# Patient Record
Sex: FEMALE | Race: WHITE
[De-identification: names, ages, dates, MRNs, and addresses within clinical notes are randomized per-mention and may not be internally consistent; named-entity substitution may affect disease eponyms.]

---

## 2017-05-10 ENCOUNTER — APPOINTMENT (OUTPATIENT)
Dept: HEMATOLOGY ONCOLOGY | Facility: CLINIC | Age: 52
End: 2017-05-10

## 2017-06-09 ENCOUNTER — OUTPATIENT (OUTPATIENT)
Dept: OUTPATIENT SERVICES | Facility: HOSPITAL | Age: 52
LOS: 1 days | Discharge: HOME | End: 2017-06-09

## 2017-06-28 DIAGNOSIS — Z12.31 ENCOUNTER FOR SCREENING MAMMOGRAM FOR MALIGNANT NEOPLASM OF BREAST: ICD-10-CM

## 2018-04-03 ENCOUNTER — OUTPATIENT (OUTPATIENT)
Dept: OUTPATIENT SERVICES | Facility: HOSPITAL | Age: 53
LOS: 1 days | Discharge: HOME | End: 2018-04-03

## 2018-04-03 DIAGNOSIS — E04.1 NONTOXIC SINGLE THYROID NODULE: ICD-10-CM

## 2018-05-21 ENCOUNTER — EMERGENCY (EMERGENCY)
Facility: HOSPITAL | Age: 53
LOS: 0 days | Discharge: HOME | End: 2018-05-21
Attending: EMERGENCY MEDICINE | Admitting: EMERGENCY MEDICINE

## 2018-05-21 VITALS
SYSTOLIC BLOOD PRESSURE: 125 MMHG | HEART RATE: 68 BPM | RESPIRATION RATE: 20 BRPM | TEMPERATURE: 98 F | OXYGEN SATURATION: 98 % | DIASTOLIC BLOOD PRESSURE: 75 MMHG
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
## 2018-05-21 DIAGNOSIS — M54.9 DORSALGIA, UNSPECIFIED: ICD-10-CM

## 2018-05-21 DIAGNOSIS — R51 HEADACHE: ICD-10-CM

## 2018-05-21 DIAGNOSIS — Z79.899 OTHER LONG TERM (CURRENT) DRUG THERAPY: ICD-10-CM
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
## 2018-05-21 DIAGNOSIS — S13.4XXA SPRAIN OF LIGAMENTS OF CERVICAL SPINE, INITIAL ENCOUNTER: ICD-10-CM

## 2018-05-21 DIAGNOSIS — M25.519 PAIN IN UNSPECIFIED SHOULDER: ICD-10-CM
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
## 2018-05-21 RX ORDER — METHOCARBAMOL 500 MG/1
2 TABLET, FILM COATED ORAL
Qty: 20 | Refills: 0 | OUTPATIENT
Start: 2018-05-21 | End: 2018-05-25

## 2018-05-21 RX ORDER — IBUPROFEN 200 MG
600 TABLET ORAL ONCE
Qty: 0 | Refills: 0 | Status: COMPLETED | OUTPATIENT
Start: 2018-05-21 | End: 2018-05-21

## 2018-05-21 RX ADMIN — Medication 600 MILLIGRAM(S): at 21:50
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
## 2018-05-22 ENCOUNTER — APPOINTMENT (OUTPATIENT)
Dept: OBGYN | Facility: CLINIC | Age: 53
End: 2018-05-22
Payer: COMMERCIAL

## 2018-05-22 VITALS — BODY MASS INDEX: 28.93 KG/M2 | WEIGHT: 180 LBS | HEIGHT: 66 IN

## 2018-05-22 LAB
BILIRUB UR QL STRIP: NORMAL
GLUCOSE UR-MCNC: NORMAL
HCG UR QL: 0.2 EU/DL
HGB UR QL STRIP.AUTO: NORMAL
KETONES UR-MCNC: NORMAL
LEUKOCYTE ESTERASE UR QL STRIP: NORMAL
NITRITE UR QL STRIP: NORMAL
PH UR STRIP: 7
PROT UR STRIP-MCNC: NORMAL
SP GR UR STRIP: 1.02

## 2018-05-22 PROCEDURE — 81003 URINALYSIS AUTO W/O SCOPE: CPT | Mod: QW

## 2018-05-22 PROCEDURE — 99396 PREV VISIT EST AGE 40-64: CPT

## 2018-05-26 ENCOUNTER — RESULT CHARGE (OUTPATIENT)
Age: 53
End: 2018-05-26

## 2018-05-26 ENCOUNTER — APPOINTMENT (OUTPATIENT)
Dept: OBGYN | Facility: CLINIC | Age: 53
End: 2018-05-26
Payer: COMMERCIAL

## 2018-05-26 PROCEDURE — 76830 TRANSVAGINAL US NON-OB: CPT

## 2018-05-26 PROCEDURE — 77085 DXA BONE DENSITY AXL VRT FX: CPT

## 2018-05-26 PROCEDURE — 76856 US EXAM PELVIC COMPLETE: CPT

## 2018-05-31 LAB — HPV HIGH+LOW RISK DNA PNL CVX: NOT DETECTED

## 2018-06-10 ENCOUNTER — FORM ENCOUNTER (OUTPATIENT)
Age: 53
End: 2018-06-10

## 2018-06-11 ENCOUNTER — OUTPATIENT (OUTPATIENT)
Dept: OUTPATIENT SERVICES | Facility: HOSPITAL | Age: 53
LOS: 1 days | Discharge: HOME | End: 2018-06-11

## 2018-06-11 DIAGNOSIS — Z12.31 ENCOUNTER FOR SCREENING MAMMOGRAM FOR MALIGNANT NEOPLASM OF BREAST: ICD-10-CM

## 2019-05-21 ENCOUNTER — APPOINTMENT (OUTPATIENT)
Dept: OBGYN | Facility: CLINIC | Age: 54
End: 2019-05-21

## 2019-05-28 ENCOUNTER — OUTPATIENT (OUTPATIENT)
Dept: OUTPATIENT SERVICES | Facility: HOSPITAL | Age: 54
LOS: 1 days | Discharge: HOME | End: 2019-05-28

## 2019-05-28 ENCOUNTER — APPOINTMENT (OUTPATIENT)
Dept: OBGYN | Facility: CLINIC | Age: 54
End: 2019-05-28
Payer: COMMERCIAL

## 2019-05-28 VITALS — BODY MASS INDEX: 29.73 KG/M2 | HEIGHT: 66 IN | WEIGHT: 185 LBS

## 2019-05-28 LAB
BILIRUB UR QL STRIP: NORMAL
GLUCOSE UR-MCNC: NORMAL
HCG UR QL: NORMAL EU/DL
HGB UR QL STRIP.AUTO: 250
KETONES UR-MCNC: NORMAL
LEUKOCYTE ESTERASE UR QL STRIP: NORMAL
NITRITE UR QL STRIP: NORMAL
PH UR STRIP: 5
PROT UR STRIP-MCNC: NORMAL
SP GR UR STRIP: 1.02

## 2019-05-28 PROCEDURE — 99396 PREV VISIT EST AGE 40-64: CPT

## 2019-05-28 PROCEDURE — 81002 URINALYSIS NONAUTO W/O SCOPE: CPT

## 2019-05-29 DIAGNOSIS — Z01.419 ENCOUNTER FOR GYNECOLOGICAL EXAMINATION (GENERAL) (ROUTINE) WITHOUT ABNORMAL FINDINGS: ICD-10-CM

## 2019-06-01 LAB — HPV HIGH+LOW RISK DNA PNL CVX: NOT DETECTED

## 2019-06-13 ENCOUNTER — FORM ENCOUNTER (OUTPATIENT)
Age: 54
End: 2019-06-13

## 2019-06-14 ENCOUNTER — OUTPATIENT (OUTPATIENT)
Dept: OUTPATIENT SERVICES | Facility: HOSPITAL | Age: 54
LOS: 1 days | Discharge: HOME | End: 2019-06-14
Payer: COMMERCIAL

## 2019-06-14 DIAGNOSIS — Z12.31 ENCOUNTER FOR SCREENING MAMMOGRAM FOR MALIGNANT NEOPLASM OF BREAST: ICD-10-CM

## 2019-06-14 PROCEDURE — 77067 SCR MAMMO BI INCL CAD: CPT | Mod: 26

## 2019-06-14 PROCEDURE — 77063 BREAST TOMOSYNTHESIS BI: CPT | Mod: 26

## 2020-06-06 ENCOUNTER — APPOINTMENT (OUTPATIENT)
Dept: OBGYN | Facility: CLINIC | Age: 55
End: 2020-06-06

## 2020-06-15 ENCOUNTER — OUTPATIENT (OUTPATIENT)
Dept: OUTPATIENT SERVICES | Facility: HOSPITAL | Age: 55
LOS: 1 days | Discharge: HOME | End: 2020-06-15
Payer: COMMERCIAL

## 2020-06-15 ENCOUNTER — RESULT REVIEW (OUTPATIENT)
Age: 55
End: 2020-06-15

## 2020-06-15 DIAGNOSIS — Z12.31 ENCOUNTER FOR SCREENING MAMMOGRAM FOR MALIGNANT NEOPLASM OF BREAST: ICD-10-CM

## 2020-06-15 PROCEDURE — 77067 SCR MAMMO BI INCL CAD: CPT | Mod: 26

## 2020-06-15 PROCEDURE — 77063 BREAST TOMOSYNTHESIS BI: CPT | Mod: 26

## 2020-07-06 ENCOUNTER — APPOINTMENT (OUTPATIENT)
Dept: OBGYN | Facility: CLINIC | Age: 55
End: 2020-07-06

## 2020-07-28 ENCOUNTER — APPOINTMENT (OUTPATIENT)
Dept: OBGYN | Facility: CLINIC | Age: 55
End: 2020-07-28
Payer: COMMERCIAL

## 2020-07-28 VITALS — BODY MASS INDEX: 30.53 KG/M2 | HEIGHT: 66 IN | TEMPERATURE: 97.2 F | WEIGHT: 190 LBS

## 2020-07-28 LAB
BILIRUB UR QL STRIP: NORMAL
CLARITY UR: CLEAR
GLUCOSE UR-MCNC: NORMAL
HCG UR QL: NORMAL EU/DL
HGB UR QL STRIP.AUTO: NORMAL
KETONES UR-MCNC: NORMAL
LEUKOCYTE ESTERASE UR QL STRIP: NORMAL
NITRITE UR QL STRIP: NORMAL
PH UR STRIP: 6.5
PROT UR STRIP-MCNC: NORMAL
SP GR UR STRIP: 1.01

## 2020-07-28 PROCEDURE — 81003 URINALYSIS AUTO W/O SCOPE: CPT | Mod: QW

## 2020-07-28 PROCEDURE — 99396 PREV VISIT EST AGE 40-64: CPT

## 2020-08-01 LAB — HPV HIGH+LOW RISK DNA PNL CVX: NOT DETECTED

## 2020-08-24 ENCOUNTER — APPOINTMENT (OUTPATIENT)
Dept: OBGYN | Facility: CLINIC | Age: 55
End: 2020-08-24
Payer: COMMERCIAL

## 2020-08-24 PROCEDURE — 77085 DXA BONE DENSITY AXL VRT FX: CPT

## 2020-12-16 ENCOUNTER — TRANSCRIPTION ENCOUNTER (OUTPATIENT)
Age: 55
End: 2020-12-16

## 2021-06-29 ENCOUNTER — NON-APPOINTMENT (OUTPATIENT)
Age: 56
End: 2021-06-29

## 2021-07-06 ENCOUNTER — NON-APPOINTMENT (OUTPATIENT)
Age: 56
End: 2021-07-06

## 2021-08-17 ENCOUNTER — OUTPATIENT (OUTPATIENT)
Dept: OUTPATIENT SERVICES | Facility: HOSPITAL | Age: 56
LOS: 1 days | Discharge: HOME | End: 2021-08-17
Payer: COMMERCIAL

## 2021-08-17 ENCOUNTER — RESULT REVIEW (OUTPATIENT)
Age: 56
End: 2021-08-17

## 2021-08-17 ENCOUNTER — APPOINTMENT (OUTPATIENT)
Dept: OBGYN | Facility: CLINIC | Age: 56
End: 2021-08-17
Payer: COMMERCIAL

## 2021-08-17 VITALS — BODY MASS INDEX: 30.53 KG/M2 | TEMPERATURE: 98.2 F | HEIGHT: 66 IN | WEIGHT: 190 LBS

## 2021-08-17 DIAGNOSIS — Z12.31 ENCOUNTER FOR SCREENING MAMMOGRAM FOR MALIGNANT NEOPLASM OF BREAST: ICD-10-CM

## 2021-08-17 PROCEDURE — 77067 SCR MAMMO BI INCL CAD: CPT | Mod: 26

## 2021-08-17 PROCEDURE — 99396 PREV VISIT EST AGE 40-64: CPT

## 2021-08-17 PROCEDURE — 81003 URINALYSIS AUTO W/O SCOPE: CPT | Mod: QW

## 2021-08-17 PROCEDURE — 77063 BREAST TOMOSYNTHESIS BI: CPT | Mod: 26

## 2021-08-25 LAB
BILIRUB UR QL STRIP: NORMAL
GLUCOSE UR-MCNC: NORMAL
HCG UR QL: 0.2 EU/DL
HGB UR QL STRIP.AUTO: NORMAL
HPV HIGH+LOW RISK DNA PNL CVX: NOT DETECTED
KETONES UR-MCNC: NORMAL
LEUKOCYTE ESTERASE UR QL STRIP: NORMAL
NITRITE UR QL STRIP: NORMAL
PH UR STRIP: 5
PROT UR STRIP-MCNC: NORMAL
SP GR UR STRIP: 1.02

## 2021-08-27 ENCOUNTER — APPOINTMENT (OUTPATIENT)
Dept: ORTHOPEDIC SURGERY | Facility: CLINIC | Age: 56
End: 2021-08-27

## 2021-09-06 LAB — CYTOLOGY CVX/VAG DOC THIN PREP: NORMAL

## 2021-12-08 ENCOUNTER — TRANSCRIPTION ENCOUNTER (OUTPATIENT)
Age: 56
End: 2021-12-08

## 2022-06-28 ENCOUNTER — NON-APPOINTMENT (OUTPATIENT)
Age: 57
End: 2022-06-28

## 2022-08-30 ENCOUNTER — APPOINTMENT (OUTPATIENT)
Dept: OBGYN | Facility: CLINIC | Age: 57
End: 2022-08-30

## 2022-08-30 VITALS — HEIGHT: 67 IN | TEMPERATURE: 98.1 F | WEIGHT: 200 LBS | BODY MASS INDEX: 31.39 KG/M2

## 2022-08-30 LAB
BILIRUB UR QL STRIP: NORMAL
GLUCOSE UR-MCNC: NORMAL
HCG UR QL: 0.2 EU/DL
HGB UR QL STRIP.AUTO: NORMAL
KETONES UR-MCNC: NORMAL
LEUKOCYTE ESTERASE UR QL STRIP: NORMAL
NITRITE UR QL STRIP: NORMAL
PH UR STRIP: 6.5
PROT UR STRIP-MCNC: NORMAL
SP GR UR STRIP: 1.02

## 2022-08-30 PROCEDURE — 99396 PREV VISIT EST AGE 40-64: CPT

## 2022-08-30 PROCEDURE — 81003 URINALYSIS AUTO W/O SCOPE: CPT | Mod: QW

## 2022-08-31 ENCOUNTER — RESULT REVIEW (OUTPATIENT)
Age: 57
End: 2022-08-31

## 2022-08-31 ENCOUNTER — OUTPATIENT (OUTPATIENT)
Dept: OUTPATIENT SERVICES | Facility: HOSPITAL | Age: 57
LOS: 1 days | Discharge: HOME | End: 2022-08-31

## 2022-08-31 DIAGNOSIS — Z12.31 ENCOUNTER FOR SCREENING MAMMOGRAM FOR MALIGNANT NEOPLASM OF BREAST: ICD-10-CM

## 2022-08-31 PROCEDURE — 77063 BREAST TOMOSYNTHESIS BI: CPT | Mod: 26

## 2022-08-31 PROCEDURE — 77067 SCR MAMMO BI INCL CAD: CPT | Mod: 26

## 2022-09-08 LAB — HPV HIGH+LOW RISK DNA PNL CVX: NOT DETECTED

## 2022-09-11 LAB — CYTOLOGY CVX/VAG DOC THIN PREP: NORMAL

## 2022-12-14 NOTE — ED ADULT NURSE NOTE - PSH
No significant past surgical history Simponi Counseling:  I discussed with the patient the risks of golimumab including but not limited to myelosuppression, immunosuppression, autoimmune hepatitis, demyelinating diseases, lymphoma, and serious infections.  The patient understands that monitoring is required including a PPD at baseline and must alert us or the primary physician if symptoms of infection or other concerning signs are noted.

## 2023-06-30 ENCOUNTER — NON-APPOINTMENT (OUTPATIENT)
Age: 58
End: 2023-06-30

## 2023-07-11 ENCOUNTER — NON-APPOINTMENT (OUTPATIENT)
Age: 58
End: 2023-07-11

## 2023-08-15 ENCOUNTER — APPOINTMENT (OUTPATIENT)
Dept: PULMONOLOGY | Facility: CLINIC | Age: 58
End: 2023-08-15
Payer: COMMERCIAL

## 2023-08-15 VITALS
HEIGHT: 67 IN | OXYGEN SATURATION: 99 % | DIASTOLIC BLOOD PRESSURE: 70 MMHG | HEART RATE: 77 BPM | SYSTOLIC BLOOD PRESSURE: 120 MMHG | BODY MASS INDEX: 29.82 KG/M2 | WEIGHT: 190 LBS

## 2023-08-15 DIAGNOSIS — R91.8 OTHER NONSPECIFIC ABNORMAL FINDING OF LUNG FIELD: ICD-10-CM

## 2023-08-15 DIAGNOSIS — J44.9 CHRONIC OBSTRUCTIVE PULMONARY DISEASE, UNSPECIFIED: ICD-10-CM

## 2023-08-15 PROCEDURE — 94010 BREATHING CAPACITY TEST: CPT

## 2023-08-15 PROCEDURE — 99204 OFFICE O/P NEW MOD 45 MIN: CPT | Mod: 25

## 2023-08-15 RX ORDER — ALBUTEROL SULFATE 90 UG/1
108 (90 BASE) INHALANT RESPIRATORY (INHALATION)
Qty: 1 | Refills: 3 | Status: ACTIVE | COMMUNITY
Start: 2023-08-15 | End: 1900-01-01

## 2023-08-15 NOTE — HISTORY OF PRESENT ILLNESS
[TextBox_4] : 58 year old female, heavy smoker of 1.5 ppd; active smoker still, spirometry done with moderate COPD, occasional cough, no dyspnea, presenting for follow up on her LDCT and for COPD management. On exam she has mild wheezing; will prescribe albuterol as needed. She was given breo by her PCP but she rarely uses it. Will need full PFTs and repeat LDCT end of the month.

## 2023-08-15 NOTE — ASSESSMENT
[FreeTextEntry1] : Moderate COPD  Active smoker  Smoking cessation next visit - not ready at the moment Will need PFTs next visit  LDCT to be repeated end of Aug  For COPD - Albuterol as needed  F/U in 3 months

## 2023-08-15 NOTE — PHYSICAL EXAM
[No Acute Distress] : no acute distress [Normal Appearance] : normal appearance [Normal Oropharynx] : normal oropharynx [No Neck Mass] : no neck mass [Normal Rate/Rhythm] : normal rate/rhythm [Normal S1, S2] : normal s1, s2 [No Murmurs] : no murmurs [Clear to Auscultation Bilaterally] : clear to auscultation bilaterally [Wheeze] : wheeze [No Abnormalities] : no abnormalities [Benign] : benign [Normal Gait] : normal gait [No Clubbing] : no clubbing [No Cyanosis] : no cyanosis [No Edema] : no edema [FROM] : FROM [Normal Color/ Pigmentation] : normal color/ pigmentation [No Focal Deficits] : no focal deficits [Oriented x3] : oriented x3 [Normal Affect] : normal affect

## 2023-08-28 ENCOUNTER — APPOINTMENT (OUTPATIENT)
Dept: OBGYN | Facility: CLINIC | Age: 58
End: 2023-08-28
Payer: COMMERCIAL

## 2023-08-28 PROCEDURE — 77080 DXA BONE DENSITY AXIAL: CPT

## 2023-09-05 ENCOUNTER — APPOINTMENT (OUTPATIENT)
Dept: OBGYN | Facility: CLINIC | Age: 58
End: 2023-09-05
Payer: COMMERCIAL

## 2023-09-05 VITALS — HEIGHT: 67 IN | BODY MASS INDEX: 29.82 KG/M2 | WEIGHT: 190 LBS

## 2023-09-05 DIAGNOSIS — Z01.419 ENCOUNTER FOR GYNECOLOGICAL EXAMINATION (GENERAL) (ROUTINE) W/OUT ABNORMAL FINDINGS: ICD-10-CM

## 2023-09-05 DIAGNOSIS — M85.851 OTHER SPECIFIED DISORDERS OF BONE DENSITY AND STRUCTURE, RIGHT THIGH: ICD-10-CM

## 2023-09-05 PROCEDURE — 81003 URINALYSIS AUTO W/O SCOPE: CPT | Mod: QW

## 2023-09-05 PROCEDURE — 99396 PREV VISIT EST AGE 40-64: CPT

## 2023-09-05 NOTE — HISTORY OF PRESENT ILLNESS
[FreeTextEntry1] : This 58-year-old female is here for an annual GYN exam.  Her last Pap test was normal along with HPV testing August 30, 2022.  Mammography was normal August 31, 2022 revealing dense breasts.  It is recommended by radiology that she have routine 3D screening mammography annually.  Bone density testing on August 28, 2023 revealed mild osteopenia of the femoral neck.  She has no GYN complaints at the present time.

## 2023-09-05 NOTE — DISCUSSION/SUMMARY
[FreeTextEntry1] : This annual GYN exam revealed very mild atrophic changes of the vulva and vagina.  Bone density testing revealed mild to moderate osteopenia of the femoral neck.  The patient will schedule her annual mammogram to be done this week.  She will otherwise return to this office as needed or in 1 year for her next Pap test.

## 2023-09-05 NOTE — PHYSICAL EXAM
[FreeTextEntry7] : Upper and lower abdomen was soft nontender with no palpable masses. [Normal] : normal [FreeTextEntry1] : External genitalia revealed no lesions and no evidence of any infection.  There were no atrophic changes externally. [FreeTextEntry2] : The labia and clitoris appeared normal with no identifiable lesions. [FreeTextEntry6] : Uterus and adnexa were normal size and nontender.

## 2023-09-07 LAB
BILIRUB UR QL STRIP: NEGATIVE
CLARITY UR: CLEAR
GLUCOSE UR-MCNC: NEGATIVE
HCG UR QL: 0.2 EU/DL
HGB UR QL STRIP.AUTO: NORMAL
KETONES UR-MCNC: NORMAL
LEUKOCYTE ESTERASE UR QL STRIP: NEGATIVE
NITRITE UR QL STRIP: NEGATIVE
PH UR STRIP: 5.5
PROT UR STRIP-MCNC: NORMAL
SP GR UR STRIP: 1.02

## 2023-09-09 ENCOUNTER — RESULT REVIEW (OUTPATIENT)
Age: 58
End: 2023-09-09

## 2023-09-09 ENCOUNTER — OUTPATIENT (OUTPATIENT)
Dept: OUTPATIENT SERVICES | Facility: HOSPITAL | Age: 58
LOS: 1 days | End: 2023-09-09
Payer: COMMERCIAL

## 2023-09-09 DIAGNOSIS — Z12.31 ENCOUNTER FOR SCREENING MAMMOGRAM FOR MALIGNANT NEOPLASM OF BREAST: ICD-10-CM

## 2023-09-09 PROCEDURE — 77067 SCR MAMMO BI INCL CAD: CPT | Mod: 26

## 2023-09-09 PROCEDURE — 77063 BREAST TOMOSYNTHESIS BI: CPT

## 2023-09-09 PROCEDURE — 77067 SCR MAMMO BI INCL CAD: CPT

## 2023-09-09 PROCEDURE — 77063 BREAST TOMOSYNTHESIS BI: CPT | Mod: 26

## 2023-09-10 DIAGNOSIS — Z12.31 ENCOUNTER FOR SCREENING MAMMOGRAM FOR MALIGNANT NEOPLASM OF BREAST: ICD-10-CM

## 2023-09-11 LAB
CYTOLOGY CVX/VAG DOC THIN PREP: NORMAL
HPV HIGH+LOW RISK DNA PNL CVX: NOT DETECTED

## 2023-12-27 ENCOUNTER — APPOINTMENT (OUTPATIENT)
Dept: PULMONOLOGY | Facility: CLINIC | Age: 58
End: 2023-12-27
Payer: COMMERCIAL

## 2023-12-27 ENCOUNTER — APPOINTMENT (OUTPATIENT)
Dept: PULMONOLOGY | Facility: CLINIC | Age: 58
End: 2023-12-27

## 2023-12-27 VITALS
BODY MASS INDEX: 31.32 KG/M2 | DIASTOLIC BLOOD PRESSURE: 65 MMHG | WEIGHT: 200 LBS | OXYGEN SATURATION: 96 % | HEART RATE: 88 BPM | SYSTOLIC BLOOD PRESSURE: 140 MMHG

## 2023-12-27 PROCEDURE — 94010 BREATHING CAPACITY TEST: CPT

## 2023-12-27 PROCEDURE — 94729 DIFFUSING CAPACITY: CPT

## 2023-12-27 PROCEDURE — 94727 GAS DIL/WSHOT DETER LNG VOL: CPT

## 2023-12-28 RX ORDER — ALBUTEROL SULFATE 90 UG/1
108 (90 BASE) INHALANT RESPIRATORY (INHALATION)
Qty: 1 | Refills: 3 | Status: ACTIVE | COMMUNITY
Start: 2023-12-28 | End: 1900-01-01

## 2023-12-28 RX ORDER — PREDNISONE 20 MG/1
20 TABLET ORAL
Qty: 20 | Refills: 0 | Status: ACTIVE | COMMUNITY
Start: 2023-12-28 | End: 1900-01-01

## 2024-06-17 ENCOUNTER — OUTPATIENT (OUTPATIENT)
Dept: OUTPATIENT SERVICES | Facility: HOSPITAL | Age: 59
LOS: 1 days | End: 2024-06-17
Payer: COMMERCIAL

## 2024-06-17 DIAGNOSIS — Z00.8 ENCOUNTER FOR OTHER GENERAL EXAMINATION: ICD-10-CM

## 2024-06-17 DIAGNOSIS — I25.10 ATHEROSCLEROTIC HEART DISEASE OF NATIVE CORONARY ARTERY WITHOUT ANGINA PECTORIS: ICD-10-CM

## 2024-06-17 PROCEDURE — 75574 CT ANGIO HRT W/3D IMAGE: CPT | Mod: 26

## 2024-06-17 PROCEDURE — 75574 CT ANGIO HRT W/3D IMAGE: CPT

## 2024-06-18 DIAGNOSIS — I25.10 ATHEROSCLEROTIC HEART DISEASE OF NATIVE CORONARY ARTERY WITHOUT ANGINA PECTORIS: ICD-10-CM

## 2024-09-13 ENCOUNTER — RESULT REVIEW (OUTPATIENT)
Age: 59
End: 2024-09-13

## 2024-09-13 ENCOUNTER — OUTPATIENT (OUTPATIENT)
Dept: OUTPATIENT SERVICES | Facility: HOSPITAL | Age: 59
LOS: 1 days | End: 2024-09-13
Payer: COMMERCIAL

## 2024-09-13 DIAGNOSIS — Z12.31 ENCOUNTER FOR SCREENING MAMMOGRAM FOR MALIGNANT NEOPLASM OF BREAST: ICD-10-CM

## 2024-09-13 PROCEDURE — 77063 BREAST TOMOSYNTHESIS BI: CPT | Mod: 26

## 2024-09-13 PROCEDURE — 77067 SCR MAMMO BI INCL CAD: CPT | Mod: 26

## 2024-09-13 PROCEDURE — 77063 BREAST TOMOSYNTHESIS BI: CPT

## 2024-09-13 PROCEDURE — 77067 SCR MAMMO BI INCL CAD: CPT

## 2024-09-14 DIAGNOSIS — Z12.31 ENCOUNTER FOR SCREENING MAMMOGRAM FOR MALIGNANT NEOPLASM OF BREAST: ICD-10-CM

## 2024-09-17 ENCOUNTER — OUTPATIENT (OUTPATIENT)
Dept: OUTPATIENT SERVICES | Facility: HOSPITAL | Age: 59
LOS: 1 days | End: 2024-09-17
Payer: COMMERCIAL

## 2024-09-17 ENCOUNTER — RESULT REVIEW (OUTPATIENT)
Age: 59
End: 2024-09-17

## 2024-09-17 DIAGNOSIS — R92.8 OTHER ABNORMAL AND INCONCLUSIVE FINDINGS ON DIAGNOSTIC IMAGING OF BREAST: ICD-10-CM

## 2024-09-17 PROCEDURE — G0279: CPT | Mod: 26

## 2024-09-17 PROCEDURE — 77065 DX MAMMO INCL CAD UNI: CPT | Mod: 26,LT

## 2024-09-17 PROCEDURE — 77061 BREAST TOMOSYNTHESIS UNI: CPT | Mod: 26,LT

## 2024-09-17 PROCEDURE — 77065 DX MAMMO INCL CAD UNI: CPT | Mod: LT

## 2024-09-17 PROCEDURE — G0279: CPT

## 2024-09-18 DIAGNOSIS — R92.8 OTHER ABNORMAL AND INCONCLUSIVE FINDINGS ON DIAGNOSTIC IMAGING OF BREAST: ICD-10-CM

## 2024-09-26 ENCOUNTER — RESULT REVIEW (OUTPATIENT)
Age: 59
End: 2024-09-26

## 2024-09-26 ENCOUNTER — OUTPATIENT (OUTPATIENT)
Dept: OUTPATIENT SERVICES | Facility: HOSPITAL | Age: 59
LOS: 1 days | End: 2024-09-26
Payer: COMMERCIAL

## 2024-09-26 ENCOUNTER — APPOINTMENT (OUTPATIENT)
Age: 59
End: 2024-09-26
Payer: COMMERCIAL

## 2024-09-26 DIAGNOSIS — R92.8 OTHER ABNORMAL AND INCONCLUSIVE FINDINGS ON DIAGNOSTIC IMAGING OF BREAST: ICD-10-CM

## 2024-09-26 PROCEDURE — 88360 TUMOR IMMUNOHISTOCHEM/MANUAL: CPT | Mod: 26

## 2024-09-26 PROCEDURE — 19081 BX BREAST 1ST LESION STRTCTC: CPT | Mod: LT

## 2024-09-26 PROCEDURE — 76098 X-RAY EXAM SURGICAL SPECIMEN: CPT | Mod: 26

## 2024-09-26 PROCEDURE — 88360 TUMOR IMMUNOHISTOCHEM/MANUAL: CPT

## 2024-09-26 PROCEDURE — 88305 TISSUE EXAM BY PATHOLOGIST: CPT | Mod: 26

## 2024-09-26 PROCEDURE — 76098 X-RAY EXAM SURGICAL SPECIMEN: CPT

## 2024-09-26 PROCEDURE — 88305 TISSUE EXAM BY PATHOLOGIST: CPT

## 2024-09-26 PROCEDURE — A4648: CPT

## 2024-09-26 PROCEDURE — 77065 DX MAMMO INCL CAD UNI: CPT | Mod: LT

## 2024-09-26 PROCEDURE — 77065 DX MAMMO INCL CAD UNI: CPT | Mod: 26,LT

## 2024-09-27 ENCOUNTER — TRANSCRIPTION ENCOUNTER (OUTPATIENT)
Age: 59
End: 2024-09-27

## 2024-09-27 LAB — SURGICAL PATHOLOGY STUDY: SIGNIFICANT CHANGE UP

## 2024-09-30 DIAGNOSIS — R92.8 OTHER ABNORMAL AND INCONCLUSIVE FINDINGS ON DIAGNOSTIC IMAGING OF BREAST: ICD-10-CM

## 2024-09-30 DIAGNOSIS — D24.2 BENIGN NEOPLASM OF LEFT BREAST: ICD-10-CM

## 2024-09-30 DIAGNOSIS — D05.12 INTRADUCTAL CARCINOMA IN SITU OF LEFT BREAST: ICD-10-CM

## 2024-10-01 ENCOUNTER — APPOINTMENT (OUTPATIENT)
Dept: OBGYN | Facility: CLINIC | Age: 59
End: 2024-10-01
Payer: COMMERCIAL

## 2024-10-01 DIAGNOSIS — Z01.419 ENCOUNTER FOR GYNECOLOGICAL EXAMINATION (GENERAL) (ROUTINE) W/OUT ABNORMAL FINDINGS: ICD-10-CM

## 2024-10-01 DIAGNOSIS — Z85.3 PERSONAL HISTORY OF MALIGNANT NEOPLASM OF BREAST: ICD-10-CM

## 2024-10-01 PROCEDURE — 99396 PREV VISIT EST AGE 40-64: CPT

## 2024-10-01 NOTE — DISCUSSION/SUMMARY
[FreeTextEntry1] : Breast examination revealed a scar on the left breast from the stereotactic biopsy which revealed a left breast cancer.  There were no lumps or nipple discharge noted.  I have left a complete evaluation of the breast to the breast surgeons themselves.  She has a consultation over the next few days with a breast specialist.  She does have moderate osteopenia of the femoral neck found on bone density testing August 2023.  Any further treatment or follow-up will be dependent on the results from today and after seeing breast specialist.  The patient agrees with the recommendations and the plan.

## 2024-10-01 NOTE — HISTORY OF PRESENT ILLNESS
[FreeTextEntry1] : This 59-year-old female  2 para 2-0-0-2 is here for an annual GYN examination.  Last Pap testing on 2023 was normal along with HPV test.  She recently had a left breast biopsy on 2024 revealing breast cancer.  She is under the care of of breast surgeon at the present time and is planning on having second and third opinions before choosing the doctor that she wants to monitor and care for her breast surgery.  She has also had bone density testing in the past revealing moderate osteopenia of the femoral neck.  This was performed on 2023.  I have asked the patient to keep in contact with me after every consultation so that we can manage the plan treatment wherever she is.

## 2024-10-02 ENCOUNTER — APPOINTMENT (OUTPATIENT)
Dept: HEMATOLOGY ONCOLOGY | Facility: CLINIC | Age: 59
End: 2024-10-02

## 2024-10-02 ENCOUNTER — APPOINTMENT (OUTPATIENT)
Dept: BREAST CENTER | Facility: CLINIC | Age: 59
End: 2024-10-02
Payer: COMMERCIAL

## 2024-10-02 VITALS
SYSTOLIC BLOOD PRESSURE: 137 MMHG | HEART RATE: 82 BPM | HEIGHT: 67 IN | WEIGHT: 200 LBS | BODY MASS INDEX: 31.39 KG/M2 | DIASTOLIC BLOOD PRESSURE: 93 MMHG

## 2024-10-02 DIAGNOSIS — R92.8 OTHER ABNORMAL AND INCONCLUSIVE FINDINGS ON DIAGNOSTIC IMAGING OF BREAST: ICD-10-CM

## 2024-10-02 DIAGNOSIS — R92.1 MAMMOGRAPHIC CALCIFICATION FOUND ON DIAGNOSTIC IMAGING OF BREAST: ICD-10-CM

## 2024-10-02 DIAGNOSIS — D05.12 INTRADUCTAL CARCINOMA IN SITU OF LEFT BREAST: ICD-10-CM

## 2024-10-02 DIAGNOSIS — D24.2 BENIGN NEOPLASM OF LEFT BREAST: ICD-10-CM

## 2024-10-02 DIAGNOSIS — R92.30 DENSE BREASTS, UNSPECIFIED: ICD-10-CM

## 2024-10-02 DIAGNOSIS — C50.919 MALIGNANT NEOPLASM OF UNSPECIFIED SITE OF UNSPECIFIED FEMALE BREAST: ICD-10-CM

## 2024-10-02 PROCEDURE — 99205 OFFICE O/P NEW HI 60 MIN: CPT

## 2024-10-02 NOTE — ASSESSMENT
[FreeTextEntry1] : Maddy Barnes is a 60 y/o F with new dx of left DCIS, and fibroadenoma. On physical exam, there was no discrete mass felt in left breast. There is no nipple discharge or inversion bilaterally. There are no skin changes bilaterally. We had a lengthy discussion regarding her diagnosis and treatment options. Her pathology results were reviewed. Her surgical options were discussed, including breast conserving therapy (lumpectomy) to negative margins, or mastectomy, with or without reconstruction. Mastectomy techniques were discussed in detail. Reconstructive options were discussed with the risks and benefits to each.  We discussed that there is no difference in survival rate between lumpectomy with radiation therapy versus a mastectomy. However, the rate of local recurrence is slightly higher with lumpectomy. The rate of recurrence with mastectomy is not zero, and is likely closer to 4-9%. Pt was recommended lumpectomy based on her dx. At this time, she is leaning towards undergoing breast conservation therapy with lumpectomy. She will need to be preoperatively inserted with a smart clip placement to detect the exact location. The benefits and risks of the lumpectomy procedure were explained to the patient, including but not limited to bleeding, infection, seroma and/or hematoma formation, pain, numbness of skin, scarring, and possible re-operation if the surgical excision demonstrates positive margins. She is aware that she will meet with the pre-surgical department here at the hospital for pre-surgery testing. She is also aware that she will likely need to meet with her primary care physician, and/or other medical specialists to obtain clearance for surgery, and to contact them appropriately. Pt was recommended to get genetic testing done.  total time on encounter: 62 mins  PLAN:  -Left Lumpectomy with TAG Genetic testing Decision RT

## 2024-10-02 NOTE — END OF VISIT
[FreeTextEntry4] :  All medical record entries made by the surekha were at my, NA Farrell, direction and personally dictated by me on 10/02/2024. I have reviewed the chart and agreed that the record accurately reflects my personal performance of the history, physical examination, assessment and plan. I have also personally directed, reviewed and agreed with the chart.

## 2024-10-02 NOTE — PHYSICAL EXAM
[Symmetrical] : symmetrical [No dominant masses] : no dominant masses in right breast  [No dominant masses] : no dominant masses left breast [Breast Mass Right Breast ___cm] : no masses [Breast Mass Left Breast ___cm] : no masses [Breast Nipple Inversion] : nipples not inverted [Breast Nipple Retraction] : nipples not retracted [Breast Nipple Flattening] : nipples not flattened [Breast Nipple Fissures] : nipples not fissured [Breast Abnormal Lactation (Galactorrhea)] : no galactorrhea [Breast Abnormal Secretion Bloody Fluid] : no bloody discharge [Breast Abnormal Secretion Serous Fluid] : no serous discharge [Breast Abnormal Secretion Opalescent Fluid] : no milky discharge [No Axillary Lymphadenopathy] : no left axillary lymphadenopathy

## 2024-10-02 NOTE — ADDENDUM
[FreeTextEntry1] :  Documented by Germán Lam acting as a scribe for Dr. FOSTER [facility] on 10/02/2024.

## 2024-10-02 NOTE — DISCUSSION/SUMMARY
[FreeTextEntry1] : REASON FOR VISIT: Ms. Maddy Barnes is a 59-year-old female who was referred by _ for cancer genetic counseling and risk assessment due to a personal history of breast cancer. The patient was seen on 10/2/2024 through [facility]. The patient was accompanied by her twin daughters.    RELEVANT MEDICAL AND SURGICAL HISTORY: The patient will be scheduled for a lumpectomy.    OTHER MEDICAL AND SURGICAL HISTORY: - High cholesterol - Cholecstectimy - Bladder reconstruction - Rotator cuff repair     PAST OB/GYN HISTORY: Obstetrical History:  Age at Menarche: 12 Post-Menopausal: 41 Age at First Live Birth: Denied  Oral Contraceptive Use: former use  Hormone Replacement Therapy: Denied    CANCER SCREENING HISTORY: Breast: Last mammogram _. Frequency: __. GYN: Last visit 10/1/2024. Frequency: annual. Patient reported no abnormalities. Colon: Last colonoscopy 2023, the patient reported no polyps. Frequency: 5 years. Unknown quantity of polyps. Skin: Normal skin exams.   SOCIAL HISTORY:   Tobacco-product use: Current smoker, started in her 20s   FAMILY HISTORY: Paternal ancestry was reported as _ and maternal ancestry was reported as _. A detailed family history of cancer was ascertained, see below for pedigree. Of note: - Sister with breast cancer at 58 - Brother with pancreatic cancer at 59     The remaining family history is unremarkable. According to the patient there are no other known cases of significant cancers in the family. To her knowledge no one else in the family has had germline testing for cancer susceptibility.   RISK ASSESSMENT: Ms. _'s family history of cancer is suggestive of a hereditary cancer susceptibility syndrome. Variants in _ cancer susceptibility genes were of specific concern.   We discussed the risks, benefits and limitations, financial cost and implications of genetic testing. We also discussed the psychosocial implications of genetic testing. Possible test results were reviewed with the patient, along with associated medical management options. The Genetic Information Non-discrimination Act (ROYER) was also reviewed.   The patient consented to genetic testing for hereditary cancer. A sample was obtained from the patient in our clinic and will be sent to [facility] for analysis.   PLAN: 1. The patient's sample will be sent to [facility] for analysis. 2. We will contact the patient once the results are available. Results generally return in 2-3 weeks.   For any additional questions please call Cancer Genetics at [phone] or [phone].     Elzbieta Cintron MS, [facility] Genetic Counselor, Cancer Genetics

## 2024-10-02 NOTE — DATA REVIEWED
[FreeTextEntry1] : ACC: [id number]     EXAM:  MG MAMMO SCREEN W DENILSON BI#   ORDERED BY: NAOMI ROSARIO  PROCEDURE DATE:  09/13/2024    INTERPRETATION:  HISTORY: Bilateral MG MAMMO SCREEN W DENILSON BI# was performed. Patient is 59 years old and is seen for screening. The patient has no personal history of cancer.  The patient has the following family history of breast cancer:  sister, at age 58, breast cancer.  RISK ASSESSMENT: NCI Lifetime Risk: 14.6 Tyrer-zick Lifetime Risk: 19.6  CLINICAL BREAST EXAM: The patient reports their last clinical breast exam was performed over one year ago.  COMPARISON STUDIES: The present examination has been compared to prior imaging studies performed at [facility] on 08/17/2021, 08/31/2022 and 09/09/2023.  MAMMOGRAM FINDINGS: Mammography was performed including the following views: bilateral craniocaudal with tomosynthesis, bilateral mediolateral oblique with tomosynthesis.  The examination includes digital synthetic 2D and digital tomosynthesis 3D images. Additional imaging analysis was performed using CAD (computer-aided detection) software.  The breasts are heterogeneously dense, which may obscure small masses.  There are calcifications seen in the lateral left breast.  No suspicious mass, grouping of calcifications, or other abnormality is identified in the right breast.  IMPRESSION: Calcifications in the left breast require additional evaluation.  RECOMMENDATION: Patient will be recalled for additional views.  ASSESSMENT: BI-RADS Category 0:  Incomplete: Needs Additional Imaging Evaluation  The patient will be notified of these results by telephone, and will also be mailed a written summary in layman's terms.   ACC: [id number]     EXAM:  MG MAMMO DIAG W DENILSON LT#   ORDERED BY: NAOMI ROSARIO  PROCEDURE DATE:  09/17/2024    INTERPRETATION:  CLINICAL INDICATION: Callback from screening for further evaluation of left breast calcifications.  COMPARISONS: Prior mammograms dating back to 6/15/2020.  TECHNIQUE: Left breast full field digital 2D and digital tomosynthesis images as well as magnification views.  Computer-aided detection was utilized in the interpretation of this examination.  Breast composition: The breasts are heterogeneously dense, which may obscure small masses.  FINDINGS:  MAMMOGRAM:  There are grouped heterogeneous calcifications in the upper outer quadrant of the left breast. Stereotactic biopsy recommended.   IMPRESSION:  Suspicious left breast calcifications. Stereotactic biopsy is recommended.   Recommendation: Stereotactic guided biopsy.  BI-RADS Category 4: Suspicious  The above findings and recommendations were discussed with the patient at the time of the examination.  --- End of Report ---    	 ACC: [id number]     EXAM:  MG MAMMO DIAG POST PROC LT   ORDERED BY: NAOMI ROSARIO  ACC: [id number]     EXAM:  MG STEREO BX 1ST LT [facility]   ORDERED BY: NAOMI ROSARIO  PROCEDURE DATE:  09/26/2024    INTERPRETATION:  CLINICAL HISTORY: Left breast calcifications.  TECHNIQUE AND PROCEDURE: Left breast stereotactic vacuum assisted core biopsy and post clip placement two view left breast mammogram.  Images and reports were reviewed. Consent was obtained following a discussion of risks and benefits of the procedure as well as questioning about patient allergies. The patient safety checklist, including time out procedure, was used.  The left breast was compressed and stereo and tomographic images were obtained to locate the calcifications. The calcifications were located in the upper outer quadrant of the breast. A lateral arm approach was used. The area was prepped and draped in the normal sterile fashion. The skin was anesthetized with 5 mL buffered 1% lidocaine. A small skin incision was made. Through the incision, using a biopsy gun, a 9 gauge needle was introduced and pre-and post-fire stereo images were obtained. Deep anesthesia was given with 10 mL 1% lidocaine with epinephrine. Numerous specimens were taken. The specimens were x-rayed and several of them contain microcalcifications.  Through the needle a radiopaque marker (iAdvize mini-cork) was deposited. The needle was removed and the breast was compressed to achieve hemostasis. The patient tolerated the procedure well and there was no immediate post procedure complication.  The specimens were  into those that contain calcifications and those that do not contain calcifications and were put in different containers and sent to pathology.  A Left mammogram was performed: VIEWS: CC and ML views of the left breast. BREAST COMPOSITION: The breasts are heterogeneously dense, which may obscure small masses. FINDINGS: The biopsy clip placed during the stereotactic guided biopsy is in the anticipated location in the left breast. FINAL ASSESSMENT Category: Post Procedure Mammogram for Marker Placement  The patient tolerated the procedure well and left the department in good condition with written discharge instructions.  IMPRESSION:  Successful stereotactic core biopsy of the left breast.  Histology: Pending, to be reported in an addendum.  --- End of Report ---    Andi Accession Number : [id number] Patient:     BEV ZAYAS   Accession:                             [id number]  Collected Date/Time:                   9/26/2024 15:12 EDT Received Date/Time:                    9/26/2024 15:34 EDT  Surgical Pathology Report - Auth (Verified)  Specimen(s) Submitted Left breast Time obtained: 3:12 pm Time in formalin: 3:15 pm  Final Diagnosis Breast, left calcifications, stereotactic guided vacuum-assisted needle core biopsies:  - Ductal carcinoma in situ (DCIS), cribriform and micropapillary types with both comedonecrosis and calcifications, intermediate to focally high nuclear grade. - Fibroadenoma. - Pending special studies for ER/ND receptor protein expression with the results to follow and to be reported as a separate addendum. - Radiographic/pathologic correlation with the specimen's digital image reviewed on PACS is performed. Verified by: Dami Jimenez M.D. (Electronic Signature) Reported on: 09/27/24 12:31 EDT, [facility], [address] [place] [address] Phone: [phone]   Fax: [phone] _________________________________________________________________   Clinical Information Stereo core  Perioperative Diagnosis Calcifications, R/O DCIS  Gross Description Received in formalin labeled "left breast".  The specimen consists of 4 cores of tan-yellow fibrofatty tissue measuring 1.7 cm - 2.8 cm in length and no greater than 0.5 cm in diameter.  The specimen is entirely submitted.  Summary of sections: 5O-ahcdobsln-1 1B-remaining tissue-1  Total blocks - 2

## 2024-10-02 NOTE — HISTORY OF PRESENT ILLNESS
[FreeTextEntry1] : Maddy Barnes is a 60 y/o F who presents for an initial evaluation. New dx left breast DCIS, and FA Pt has PMHx of COPD, and coronary artery disease.  Pt denies of feeling any breast lumps. Pt denies of any nipple discharge, and skin changes.   FHx, sister has breast cancer Mother  of esophagus cancer as per the pt, brother  of pancreatic cancer     Her imaging is as follows:  2024 - b/l mammo --> BIRADS0 - The breasts are heterogeneously dense, which may obscure small masses. - Calcifications in the left breast require additional evaluation -Patient will be recalled for additional views.  2024 - Left dx mammo --> BIRADS4 -Suspicious left breast calcifications.  -Recommendation: Stereotactic guided biopsy.   2024 - Left Stereotactic Biopsy  -Successful stereotactic core biopsy of the left breast.  2024 - Pathology   -Left breast - Ductal carcinoma in situ (DCIS), cribriform and micropapillary types with both comedonecrosis and calcifications, intermediate to focally high nuclear grade. - Fibroadenoma.

## 2024-10-02 NOTE — HISTORY OF PRESENT ILLNESS
[FreeTextEntry1] : Maddy Barnes is a 58 y/o F who presents for an initial evaluation. New dx left breast DCIS, and FA Pt has PMHx of COPD, and coronary artery disease.  Pt denies of feeling any breast lumps. Pt denies of any nipple discharge, and skin changes.   FHx, sister has breast cancer Mother  of esophagus cancer as per the pt, brother  of pancreatic cancer     Her imaging is as follows:  2024 - b/l mammo --> BIRADS0 - The breasts are heterogeneously dense, which may obscure small masses. - Calcifications in the left breast require additional evaluation -Patient will be recalled for additional views.  2024 - Left dx mammo --> BIRADS4 -Suspicious left breast calcifications.  -Recommendation: Stereotactic guided biopsy.   2024 - Left Stereotactic Biopsy  -Successful stereotactic core biopsy of the left breast.  2024 - Pathology   -Left breast - Ductal carcinoma in situ (DCIS), cribriform and micropapillary types with both comedonecrosis and calcifications, intermediate to focally high nuclear grade. - Fibroadenoma.

## 2024-10-02 NOTE — PAST MEDICAL HISTORY
[Menarche Age ____] : age at menarche was [unfilled] [Menopause Age____] : age at menopause was [unfilled] [Total Preg ___] : G[unfilled] [Age At Live Birth ___] : Age at live birth: [unfilled] [FreeTextEntry7] : Betito than 5 years  [FreeTextEntry8] : YES Male

## 2024-10-02 NOTE — DATA REVIEWED
[FreeTextEntry1] : ACC: [id number]     EXAM:  MG MAMMO SCREEN W DENILSON BI#   ORDERED BY: NAOMI ROSARIO  PROCEDURE DATE:  09/13/2024    INTERPRETATION:  HISTORY: Bilateral MG MAMMO SCREEN W DENILSON BI# was performed. Patient is 59 years old and is seen for screening. The patient has no personal history of cancer.  The patient has the following family history of breast cancer:  sister, at age 58, breast cancer.  RISK ASSESSMENT: NCI Lifetime Risk: 14.6 Tyrer-zick Lifetime Risk: 19.6  CLINICAL BREAST EXAM: The patient reports their last clinical breast exam was performed over one year ago.  COMPARISON STUDIES: The present examination has been compared to prior imaging studies performed at [facility] on 08/17/2021, 08/31/2022 and 09/09/2023.  MAMMOGRAM FINDINGS: Mammography was performed including the following views: bilateral craniocaudal with tomosynthesis, bilateral mediolateral oblique with tomosynthesis.  The examination includes digital synthetic 2D and digital tomosynthesis 3D images. Additional imaging analysis was performed using CAD (computer-aided detection) software.  The breasts are heterogeneously dense, which may obscure small masses.  There are calcifications seen in the lateral left breast.  No suspicious mass, grouping of calcifications, or other abnormality is identified in the right breast.  IMPRESSION: Calcifications in the left breast require additional evaluation.  RECOMMENDATION: Patient will be recalled for additional views.  ASSESSMENT: BI-RADS Category 0:  Incomplete: Needs Additional Imaging Evaluation  The patient will be notified of these results by telephone, and will also be mailed a written summary in layman's terms.   ACC: [id number]     EXAM:  MG MAMMO DIAG W DENILSON LT#   ORDERED BY: NAOMI ROSARIO  PROCEDURE DATE:  09/17/2024    INTERPRETATION:  CLINICAL INDICATION: Callback from screening for further evaluation of left breast calcifications.  COMPARISONS: Prior mammograms dating back to 6/15/2020.  TECHNIQUE: Left breast full field digital 2D and digital tomosynthesis images as well as magnification views.  Computer-aided detection was utilized in the interpretation of this examination.  Breast composition: The breasts are heterogeneously dense, which may obscure small masses.  FINDINGS:  MAMMOGRAM:  There are grouped heterogeneous calcifications in the upper outer quadrant of the left breast. Stereotactic biopsy recommended.   IMPRESSION:  Suspicious left breast calcifications. Stereotactic biopsy is recommended.   Recommendation: Stereotactic guided biopsy.  BI-RADS Category 4: Suspicious  The above findings and recommendations were discussed with the patient at the time of the examination.  --- End of Report ---    	 ACC: [id number]     EXAM:  MG MAMMO DIAG POST PROC LT   ORDERED BY: NAOMI ROSARIO  ACC: [id number]     EXAM:  MG STEREO BX 1ST LT [facility] [place]   ORDERED BY: NAOMI ROSARIO  PROCEDURE DATE:  09/26/2024    INTERPRETATION:  CLINICAL HISTORY: Left breast calcifications.  TECHNIQUE AND PROCEDURE: Left breast stereotactic vacuum assisted core biopsy and post clip placement two view left breast mammogram.  Images and reports were reviewed. Consent was obtained following a discussion of risks and benefits of the procedure as well as questioning about patient allergies. The patient safety checklist, including time out procedure, was used.  The left breast was compressed and stereo and tomographic images were obtained to locate the calcifications. The calcifications were located in the upper outer quadrant of the breast. A lateral arm approach was used. The area was prepped and draped in the normal sterile fashion. The skin was anesthetized with 5 mL buffered 1% lidocaine. A small skin incision was made. Through the incision, using a biopsy gun, a 9 gauge needle was introduced and pre-and post-fire stereo images were obtained. Deep anesthesia was given with 10 mL 1% lidocaine with epinephrine. Numerous specimens were taken. The specimens were x-rayed and several of them contain microcalcifications.  Through the needle a radiopaque marker ([employer] mini-cork) was deposited. The needle was removed and the breast was compressed to achieve hemostasis. The patient tolerated the procedure well and there was no immediate post procedure complication.  The specimens were  into those that contain calcifications and those that do not contain calcifications and were put in different containers and sent to pathology.  A Left mammogram was performed: VIEWS: CC and ML views of the left breast. BREAST COMPOSITION: The breasts are heterogeneously dense, which may obscure small masses. FINDINGS: The biopsy clip placed during the stereotactic guided biopsy is in the anticipated location in the left breast. FINAL ASSESSMENT Category: Post Procedure Mammogram for Marker Placement  The patient tolerated the procedure well and left the department in good condition with written discharge instructions.  IMPRESSION:  Successful stereotactic core biopsy of the left breast.  Histology: Pending, to be reported in an addendum.  --- End of Report ---    Andi Accession Number : [id number] Patient:     BEV ZAYAS   Accession:                             [id number]  Collected Date/Time:                   9/26/2024 15:12 EDT Received Date/Time:                    9/26/2024 15:34 EDT  Surgical Pathology Report - Auth (Verified)  Specimen(s) Submitted Left breast Time obtained: 3:12 pm Time in formalin: 3:15 pm  Final Diagnosis Breast, left calcifications, stereotactic guided vacuum-assisted needle core biopsies:  - Ductal carcinoma in situ (DCIS), cribriform and micropapillary types with both comedonecrosis and calcifications, intermediate to focally high nuclear grade. - Fibroadenoma. - Pending special studies for ER/VT receptor protein expression with the results to follow and to be reported as a separate addendum. - Radiographic/pathologic correlation with the specimen's digital image reviewed on PACS is performed. Verified by: Dami Jimenez M.D. (Electronic Signature) Reported on: 09/27/24 12:31 EDT, [facility], [address] [place] [address] Phone: [phone]   Fax: [phone] _________________________________________________________________   Clinical Information Stereo core  Perioperative Diagnosis Calcifications, R/O DCIS  Gross Description Received in formalin labeled "left breast".  The specimen consists of 4 cores of tan-yellow fibrofatty tissue measuring 1.7 cm - 2.8 cm in length and no greater than 0.5 cm in diameter.  The specimen is entirely submitted.  Summary of sections: 8C-dpclljidt-9 1B-remaining tissue-1  Total blocks - 2

## 2024-10-02 NOTE — ADDENDUM
[FreeTextEntry1] :  Documented by Germán Lam acting as a scribe for Dr. FOSTER [facility] on 10/02/2024.

## 2024-10-02 NOTE — PAST MEDICAL HISTORY
[Menarche Age ____] : age at menarche was [unfilled] [Menopause Age____] : age at menopause was [unfilled] [Total Preg ___] : G[unfilled] [Age At Live Birth ___] : Age at live birth: [unfilled] [FreeTextEntry7] : Betito than 5 years  [FreeTextEntry8] : YES

## 2024-10-02 NOTE — DISCUSSION/SUMMARY
[FreeTextEntry1] : REASON FOR VISIT: Ms. Maddy Barnes is a 59-year-old female who was referred by _ for cancer genetic counseling and risk assessment due to a personal history of breast cancer. The patient was seen on 10/2/2024 through [facility]. The patient was accompanied by her twin daughters.    RELEVANT MEDICAL AND SURGICAL HISTORY: The patient will be scheduled for a lumpectomy.    OTHER MEDICAL AND SURGICAL HISTORY: - High cholesterol - Cholecstectimy - Bladder reconstruction - Rotator cuff repair     PAST OB/GYN HISTORY: Obstetrical History:  Age at Menarche: 12 Post-Menopausal: 41 Age at First Live Birth: Denied  Oral Contraceptive Use: former use  Hormone Replacement Therapy: Denied    CANCER SCREENING HISTORY: Breast: Last mammogram _. Frequency: __. GYN: Last visit 10/1/2024. Frequency: annual. Patient reported no abnormalities. Colon: Last colonoscopy 2023, the patient reported no polyps. Frequency: 5 years. Unknown quantity of polyps. Skin: Normal skin exams.   SOCIAL HISTORY:   Tobacco-product use: Current smoker, started in her 20s   FAMILY HISTORY: Paternal ancestry was reported as _ and maternal ancestry was reported as _. A detailed family history of cancer was ascertained, see below for pedigree. Of note: - Sister with breast cancer at 58 - Brother with pancreatic cancer at 59     The remaining family history is unremarkable. According to the patient there are no other known cases of significant cancers in the family. To her knowledge no one else in the family has had germline testing for cancer susceptibility.   RISK ASSESSMENT: Ms. _'s family history of cancer is suggestive of a hereditary cancer susceptibility syndrome. Variants in _ cancer susceptibility genes were of specific concern.   We discussed the risks, benefits and limitations, financial cost and implications of genetic testing. We also discussed the psychosocial implications of genetic testing. Possible test results were reviewed with the patient, along with associated medical management options. The Genetic Information Non-discrimination Act (ROYER) was also reviewed.   The patient consented to genetic testing for hereditary cancer. A sample was obtained from the patient in our clinic and will be sent to [facility] for analysis.   PLAN: 1. The patient's sample will be sent to [facility] for analysis. 2. We will contact the patient once the results are available. Results generally return in 2-3 weeks.   For any additional questions please call Cancer Genetics at [phone] or [phone].     Elzbieta Cintron MS, [facility] Genetic Counselor, Cancer Genetics

## 2024-10-02 NOTE — DISCUSSION/SUMMARY
[FreeTextEntry1] : REASON FOR VISIT: Ms. Maddy Barnes is a 59-year-old female who was referred by _ for cancer genetic counseling and risk assessment due to a personal history of breast cancer. The patient was seen on 10/2/2024 through [facility]. The patient was accompanied by her twin daughters.    RELEVANT MEDICAL AND SURGICAL HISTORY: The patient will be scheduled for a lumpectomy.    OTHER MEDICAL AND SURGICAL HISTORY: - High cholesterol - Cholecstectimy - Bladder reconstruction - Rotator cuff repair     PAST OB/GYN HISTORY: Obstetrical History:  Age at Menarche: 12 Post-Menopausal: 41 Age at First Live Birth: Denied  Oral Contraceptive Use: former use  Hormone Replacement Therapy: Denied    CANCER SCREENING HISTORY: Breast: Last mammogram _. Frequency: __. GYN: Last visit 10/1/2024. Frequency: annual. Patient reported no abnormalities. Colon: Last colonoscopy 2023, the patient reported no polyps. Frequency: 5 years. Unknown quantity of polyps. Skin: Normal skin exams.   SOCIAL HISTORY:   Tobacco-product use: Current smoker, started in her 20s   FAMILY HISTORY: Paternal ancestry was reported as _ and maternal ancestry was reported as _. A detailed family history of cancer was ascertained, see below for pedigree. Of note: - Sister with breast cancer at 58 - Brother with pancreatic cancer at 59     The remaining family history is unremarkable. According to the patient there are no other known cases of significant cancers in the family. To her knowledge no one else in the family has had germline testing for cancer susceptibility.   RISK ASSESSMENT: Ms. _'s family history of cancer is suggestive of a hereditary cancer susceptibility syndrome. Variants in _ cancer susceptibility genes were of specific concern.   We discussed the risks, benefits and limitations, financial cost and implications of genetic testing. We also discussed the psychosocial implications of genetic testing. Possible test results were reviewed with the patient, along with associated medical management options. The Genetic Information Non-discrimination Act (ROYER) was also reviewed.   The patient consented to genetic testing for hereditary cancer. A sample was obtained from the patient in our clinic and will be sent to [facility] for analysis.   PLAN: 1. The patient's sample will be sent to [facility] for analysis. 2. We will contact the patient once the results are available. Results generally return in 2-3 weeks.   For any additional questions please call Cancer Genetics at [phone] or [phone].     Elzbieta Cintron MS, [facility] Genetic Counselor, Cancer Genetics

## 2024-10-02 NOTE — ADDENDUM
[FreeTextEntry1] :  Documented by Germán Lam acting as a scribe for Dr. FOSTER [facility] on 10/02/2024.

## 2024-10-02 NOTE — ASSESSMENT
[FreeTextEntry1] : Maddy Barnes is a 58 y/o F with new dx of left DCIS, and fibroadenoma. On physical exam, there was no discrete mass felt in left breast. There is no nipple discharge or inversion bilaterally. There are no skin changes bilaterally. We had a lengthy discussion regarding her diagnosis and treatment options. Her pathology results were reviewed. Her surgical options were discussed, including breast conserving therapy (lumpectomy) to negative margins, or mastectomy, with or without reconstruction. Mastectomy techniques were discussed in detail. Reconstructive options were discussed with the risks and benefits to each.  We discussed that there is no difference in survival rate between lumpectomy with radiation therapy versus a mastectomy. However, the rate of local recurrence is slightly higher with lumpectomy. The rate of recurrence with mastectomy is not zero, and is likely closer to 4-9%. Pt was recommended lumpectomy based on her dx. At this time, she is leaning towards undergoing breast conservation therapy with lumpectomy. She will need to be preoperatively inserted with a smart clip placement to detect the exact location. The benefits and risks of the lumpectomy procedure were explained to the patient, including but not limited to bleeding, infection, seroma and/or hematoma formation, pain, numbness of skin, scarring, and possible re-operation if the surgical excision demonstrates positive margins. She is aware that she will meet with the pre-surgical department here at the hospital for pre-surgery testing. She is also aware that she will likely need to meet with her primary care physician, and/or other medical specialists to obtain clearance for surgery, and to contact them appropriately. Pt was recommended to get genetic testing done.  total time on encounter: 62 mins  PLAN:  -Left Lumpectomy with TAG Genetic testing Decision RT

## 2024-10-02 NOTE — DATA REVIEWED
[FreeTextEntry1] : ACC: [id number]     EXAM:  MG MAMMO SCREEN W DENILSON BI#   ORDERED BY: NAOMI ROSARIO  PROCEDURE DATE:  09/13/2024    INTERPRETATION:  HISTORY: Bilateral MG MAMMO SCREEN W DENILSON BI# was performed. Patient is 59 years old and is seen for screening. The patient has no personal history of cancer.  The patient has the following family history of breast cancer:  sister, at age 58, breast cancer.  RISK ASSESSMENT: NCI Lifetime Risk: 14.6 Tyrer-zick Lifetime Risk: 19.6  CLINICAL BREAST EXAM: The patient reports their last clinical breast exam was performed over one year ago.  COMPARISON STUDIES: The present examination has been compared to prior imaging studies performed at [facility] on 08/17/2021, 08/31/2022 and 09/09/2023.  MAMMOGRAM FINDINGS: Mammography was performed including the following views: bilateral craniocaudal with tomosynthesis, bilateral mediolateral oblique with tomosynthesis.  The examination includes digital synthetic 2D and digital tomosynthesis 3D images. Additional imaging analysis was performed using CAD (computer-aided detection) software.  The breasts are heterogeneously dense, which may obscure small masses.  There are calcifications seen in the lateral left breast.  No suspicious mass, grouping of calcifications, or other abnormality is identified in the right breast.  IMPRESSION: Calcifications in the left breast require additional evaluation.  RECOMMENDATION: Patient will be recalled for additional views.  ASSESSMENT: BI-RADS Category 0:  Incomplete: Needs Additional Imaging Evaluation  The patient will be notified of these results by telephone, and will also be mailed a written summary in layman's terms.   ACC: [id number]     EXAM:  MG MAMMO DIAG W DENILSON LT#   ORDERED BY: NAOMI ROSARIO  PROCEDURE DATE:  09/17/2024    INTERPRETATION:  CLINICAL INDICATION: Callback from screening for further evaluation of left breast calcifications.  COMPARISONS: Prior mammograms dating back to 6/15/2020.  TECHNIQUE: Left breast full field digital 2D and digital tomosynthesis images as well as magnification views.  Computer-aided detection was utilized in the interpretation of this examination.  Breast composition: The breasts are heterogeneously dense, which may obscure small masses.  FINDINGS:  MAMMOGRAM:  There are grouped heterogeneous calcifications in the upper outer quadrant of the left breast. Stereotactic biopsy recommended.   IMPRESSION:  Suspicious left breast calcifications. Stereotactic biopsy is recommended.   Recommendation: Stereotactic guided biopsy.  BI-RADS Category 4: Suspicious  The above findings and recommendations were discussed with the patient at the time of the examination.  --- End of Report ---    	 ACC: [id number]     EXAM:  MG MAMMO DIAG POST PROC LT   ORDERED BY: NAOMI ROSARIO  ACC: [id number]     EXAM:  MG STEREO BX 1ST LT [facility]   ORDERED BY: NAOMI ROSARIO  PROCEDURE DATE:  09/26/2024    INTERPRETATION:  CLINICAL HISTORY: Left breast calcifications.  TECHNIQUE AND PROCEDURE: Left breast stereotactic vacuum assisted core biopsy and post clip placement two view left breast mammogram.  Images and reports were reviewed. Consent was obtained following a discussion of risks and benefits of the procedure as well as questioning about patient allergies. The patient safety checklist, including time out procedure, was used.  The left breast was compressed and stereo and tomographic images were obtained to locate the calcifications. The calcifications were located in the upper outer quadrant of the breast. A lateral arm approach was used. The area was prepped and draped in the normal sterile fashion. The skin was anesthetized with 5 mL buffered 1% lidocaine. A small skin incision was made. Through the incision, using a biopsy gun, a 9 gauge needle was introduced and pre-and post-fire stereo images were obtained. Deep anesthesia was given with 10 mL 1% lidocaine with epinephrine. Numerous specimens were taken. The specimens were x-rayed and several of them contain microcalcifications.  Through the needle a radiopaque marker ([employer] mini-cork) was deposited. The needle was removed and the breast was compressed to achieve hemostasis. The patient tolerated the procedure well and there was no immediate post procedure complication.  The specimens were  into those that contain calcifications and those that do not contain calcifications and were put in different containers and sent to pathology.  A Left mammogram was performed: VIEWS: CC and ML views of the left breast. BREAST COMPOSITION: The breasts are heterogeneously dense, which may obscure small masses. FINDINGS: The biopsy clip placed during the stereotactic guided biopsy is in the anticipated location in the left breast. FINAL ASSESSMENT Category: Post Procedure Mammogram for Marker Placement  The patient tolerated the procedure well and left the department in good condition with written discharge instructions.  IMPRESSION:  Successful stereotactic core biopsy of the left breast.  Histology: Pending, to be reported in an addendum.  --- End of Report ---    Andi Accession Number : [id number] Patient:     BEV ZAYAS   Accession:                             [id number]  Collected Date/Time:                   9/26/2024 15:12 EDT Received Date/Time:                    9/26/2024 15:34 EDT  Surgical Pathology Report - Auth (Verified)  Specimen(s) Submitted Left breast Time obtained: 3:12 pm Time in formalin: 3:15 pm  Final Diagnosis Breast, left calcifications, stereotactic guided vacuum-assisted needle core biopsies:  - Ductal carcinoma in situ (DCIS), cribriform and micropapillary types with both comedonecrosis and calcifications, intermediate to focally high nuclear grade. - Fibroadenoma. - Pending special studies for ER/SD receptor protein expression with the results to follow and to be reported as a separate addendum. - Radiographic/pathologic correlation with the specimen's digital image reviewed on PACS is performed. Verified by: Dami Jimenez M.D. (Electronic Signature) Reported on: 09/27/24 12:31 EDT, [facility], [address] [place] [address] Phone: [phone]   Fax: [phone] _________________________________________________________________   Clinical Information Stereo core  Perioperative Diagnosis Calcifications, R/O DCIS  Gross Description Received in formalin labeled "left breast".  The specimen consists of 4 cores of tan-yellow fibrofatty tissue measuring 1.7 cm - 2.8 cm in length and no greater than 0.5 cm in diameter.  The specimen is entirely submitted.  Summary of sections: 5D-rzkeprqqx-2 1B-remaining tissue-1  Total blocks - 2

## 2024-10-03 LAB
APPEARANCE: CLEAR
BILIRUBIN URINE: NEGATIVE
BLOOD URINE: ABNORMAL
COLOR: YELLOW
GLUCOSE QUALITATIVE U: NEGATIVE
KETONES URINE: ABNORMAL
LEUKOCYTE ESTERASE URINE: NEGATIVE
NITRITE URINE: NEGATIVE
PH URINE: 5.5
PROTEIN URINE: ABNORMAL
SPECIFIC GRAVITY URINE: >=1.03
UROBILINOGEN URINE: 0.2 (ref 0.2–?)

## 2024-10-08 LAB — CYTOLOGY CVX/VAG DOC THIN PREP: NORMAL

## 2024-10-15 ENCOUNTER — NON-APPOINTMENT (OUTPATIENT)
Age: 59
End: 2024-10-15

## 2024-10-22 ENCOUNTER — NON-APPOINTMENT (OUTPATIENT)
Age: 59
End: 2024-10-22